# Patient Record
(demographics unavailable — no encounter records)

---

## 2017-04-30 NOTE — EDM.PDOC
ED HPI GENERAL MEDICAL PROBLEM





- General


Stated Complaint: LACERATION LT ANKLE


Time Seen by Provider: 04/30/17 17:21


Source of Information: Reports: Patient





- History of Present Illness


INITIAL COMMENTS - FREE TEXT/NARRATIVE: 





HISTORY AND PHYSICAL:


History of present illness:


[] Patient on riding lawnmower ran into tenths she has a 5 inch V-shaped 

laceration below her medial malleolus, she also has malleoli or tenderness 

otherwise entirely neurovascularly intact





No head injury loss consciousness no other injury no fever nausea vomiting 

chills sweats








Review of systems: 


As per history of present illness and below otherwise all systems reviewed and 

negative.


Past medical history: 


As per history of present illness and as reviewed below otherwise 

noncontributory.


Surgical history: 


As per history of present illness and as reviewed below otherwise 

noncontributory.


Social history: 


No reported history of drug or alcohol abuse.


Family history: 


As per history of present illness and as reviewed below otherwise 

noncontributory.


Physical exam:


HEENT: Atraumatic, normocephalic, pupils reactive, negative for conjunctival 

pallor or scleral icterus, mucous membranes moist, throat clear, neck supple, 

nontender, trachea midline.


Lungs: Clear to auscultation, breath sounds equal bilaterally, chest nontender.


Heart: S1S2, regular, negative for clicks, rubs, or JVD.


Abdomen: Soft, nondistended, nontender. Negative for masses or 

hepatosplenomegaly. Negative for costovertebral tenderness.


Pelvis: Stable nontender.


Genitourinary: Deferred.


Rectal: Deferred.


Extremities: Atraumatic, negative for cords or calf pain. Neurovascular 

unremarkable.


Neuro: Awake, alert, oriented. Cranial nerves II through XII unremarkable. 

Cerebellum unremarkable. Motor and sensory unremarkable throughout. Exam 

nonfocal.


skin: as per HPI





Diagnostics:


[] Left ankle 3 views








Therapeutics:


[] T. dap





#10- interupted sutures


lidocaine


keflex








Impression: 


[] Laceration medial left ankle


Left ankle pain


Definitive disposition and diagnosis as appropriate pending reevaluation and 

review of above.


  ** Left Ankle


Pain Score (Numeric/FACES): 4





- Related Data


 Allergies











Allergy/AdvReac Type Severity Reaction Status Date / Time


 


vilazodone HCl [From Viibryd] Allergy  Agitation Verified 04/30/17 17:36


 


Flu Shot Allergy  Hives Uncoded 04/30/17 17:36











Home Meds: 


 Home Meds





Escitalopram [Lexapro] 20 mg PO DAILY 09/04/15 [History]


Levothyroxine 112 mcg PO ACBREAKFAST 09/04/15 [History]


Losartan [Cozaar] 100 mg PO DAILY 09/04/15 [History]


Aspirin [Shonna Chewable Aspirin] 81 mg PO DAILY 10/12/15 [History]


Mg Trisilicate/AlH/NahCO3/AA [Gaviscon 80-14.2 MG] 1 tab PO ASDIRECTED 10/12/15 

[History]











Past Medical History


Other HEENT History: DIminished hearing bilaterally


Other Musculoskeletal History: hx: fracture toes, fx: wrist, Hairline fx Left 

skull, some arthritis to hands, back


Other Neuro History: No migraines for a long time


Other Endocrine/Metabolic History: hypothyroidism


Other Dermatologic History: breakout once in awhile "would think I am a teen-

ager"





- Past Surgical History


Other GI Surgeries/Procedures: hx: polyps


Other Oncologic Surgeries/Procedures: Breast biospy yrs ago, benign





Social & Family History





- Tobacco Use


Smoking Status *Q: Current Every Day Smoker


Years of Tobacco use: 50


Packs/Tins Daily: 0.5





- Recreational Drug Use


Recreational Drug Use: No


Drug Use in Last 12 Months: No





ED ROS GENERAL





- Review of Systems


Review Of Systems: ROS reveals no pertinent complaints other than HPI.





ED EXAM, GENERAL





- Physical Exam


Exam: See Below





Course





- Vital Signs


Last Recorded V/S: 


 Last Vital Signs











Temp  36.8 C   04/30/17 17:32


 


Pulse  68   04/30/17 17:32


 


Resp  18   04/30/17 17:32


 


BP  156/70 H  04/30/17 17:32


 


Pulse Ox  94 L  04/30/17 17:32














- Orders/Labs/Meds


Orders: 


 Active Orders 24 hr











 Category Date Time Status


 


 Vaccines to be Administered [RC] PER UNIT ROUTINE Care  04/30/17 17:23 Active


 


 Ankle Min 3V Lt [CR] Stat Exams  04/30/17 17:23 Taken











Meds: 


Medications














Discontinued Medications














Generic Name Dose Route Start Last Admin





  Trade Name Freq  PRN Reason Stop Dose Admin


 


Bacitracin  Confirm  04/30/17 18:11  04/30/17 18:14





  Bacitracin Oint 1 Gm  Administered  04/30/17 18:12  Not Given





  Dose   





  2 dose   





  .ROUTE   





  .STK-MED ONE   


 


Bacitracin  2 dose  04/30/17 18:14  04/30/17 18:15





  Bacitracin Oint 1 Gm  TOP  04/30/17 18:15  2 dose





  ONETIME ONE   Administration


 


Diphtheria/Tetanus/Acell Pertussis  0.5 ml  04/30/17 17:23  04/30/17 18:15





  Adacel  IM  04/30/17 17:24  0.5 ml





  .ONCE ONE   Administration


 


Lidocaine HCl  Confirm  04/30/17 18:12  04/30/17 18:14





  Xylocaine 1%  Administered  04/30/17 18:13  Not Given





  Dose   





  20 ml   





  .ROUTE   





  .STK-MED ONE   


 


Lidocaine HCl  20 ml  04/30/17 18:14  04/30/17 18:15





  Xylocaine 1%  INJECT  04/30/17 18:15  20 ml





  ONETIME ONE   Administration














Departure





- Departure


Time of Disposition: 18:50


Disposition: Home, Self-Care 01


Condition: good


Clinical Impression: 


 Laceration





Additional Instructions: 


standard wound care


return if fever /nausea. chills/ sweats redness warmth or pus drainage


suture out 10 days


follow up primary care as needed


keflex 500mg po bid #20 no rf














- My Orders


Last 24 Hours: 


My Active Orders





04/30/17 17:23


Vaccines to be Administered [RC] PER UNIT ROUTINE 


Ankle Min 3V Lt [CR] Stat 














- Assessment/Plan


Last 24 Hours: 


My Active Orders





04/30/17 17:23


Vaccines to be Administered [RC] PER UNIT ROUTINE 


Ankle Min 3V Lt [CR] Stat

## 2017-05-01 NOTE — CR
EXAM DATE: 17



PATIENT'S AGE: 70



Patient: FATOU FRAIRE



Facility: [place], [place]

Patient ID: [id number]

Site Patient ID: [id number].

Site Accession #: [id number]

: 1946

Study: XRay Extremity Left Ankle [id number]/30/2017 6:11:24 PM

Ordering Physician: Karon Andre



Final Report: 

INDICATION: injury/laceration



3 views of the left ankle.





Findings: There is no acute fracture, malalignment or degenerative change. Soft 
tissues are radiographically unremarkable. Os calcis spur. No foreign body.



Impression: Unremarkable radiographs of the left ankle.



Dictated by: Momo Meyers MD @ 2017 18:18:08

(Electronic Signature)



Report Signed by Proxy.
LIZETH